# Patient Record
Sex: MALE | Race: WHITE | NOT HISPANIC OR LATINO | URBAN - METROPOLITAN AREA
[De-identification: names, ages, dates, MRNs, and addresses within clinical notes are randomized per-mention and may not be internally consistent; named-entity substitution may affect disease eponyms.]

---

## 2025-01-08 ENCOUNTER — CLINICAL SUPPORT (OUTPATIENT)
Dept: AUDIOLOGY | Facility: CLINIC | Age: 30
End: 2025-01-08
Payer: COMMERCIAL

## 2025-01-08 DIAGNOSIS — H90.3 SENSORINEURAL HEARING LOSS (SNHL) OF BOTH EARS: Primary | ICD-10-CM

## 2025-01-08 PROCEDURE — 92626 EVAL AUD FUNCJ 1ST HOUR: CPT | Mod: 59 | Performed by: AUDIOLOGIST

## 2025-01-08 PROCEDURE — 92604 REPROGRAM COCHLEAR IMPLT 7/>: CPT | Performed by: AUDIOLOGIST

## 2025-01-08 NOTE — PROGRESS NOTES
Name: Hipolito Samuels  YOB: 1995  Age: 29 y.o.    Date of Evaluation:  01/08/2025     History of Present Illness:  Hipolito Samuels, age 29 years, was seen for programming of his Nucleus  (CA) cochlear implant at the right ear, used in conjunction with the N7 ear level speech processor and program optimization of his left CI24RE cochlear implant used in conjunction with a new N7 ear level speech processor. Hipolito has not worn a processor on the left side for about 2 years due to intermittent functioning and then issues with the upgrade process. No concerns for function of the right sound processor or with sound quality.      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 Hipolito underwent the surgical implantation of the Cochlear Nucleus CI24RE cochlear implant at the left ear on Wanda 10, 2013. He then underwent the surgical implantation of the Cochlear Nucleus  cochlear implant at the right ear on February 15, 2018. Post-operative course remains unremarkable.      Procedure  Datalogging: RIGHT = ~13 hrs/day.              LEFT = 0 hours/day     Impedances were evaluated using the ear level LC3020 speech processors for intra-cochlear electrodes 1-22 in common ground (CG), Monopolar 1 (MP1), Monopolar 2 (MP2) and MP1+2 stimulation modes. Satisfactory impedances were found for all electrodes, in each stimulation mode. Impedances were noted to be high for the left speech processor which is consistent with non-use     The [id number] ear level sound processors remain programmed in an MP1+2 stimulation mode using an ACE speech processing strategy, 8 maxima and a 900 Hz stimulation rate. Electrodes 1-22 remain activated.      RIGHT PROGRAMMING: C-levels were measured across the array for comfortable loudness with minimal to no changes across the array. T-levels were not assessed.    LEFT PROGRAMMING: C-levels were measured across the  array for comfortable loudness with increases to stimulation across the array. In live mode, C-levels were globally adjusted to match the loudness of his right cochlear implant. T-levels were not assessed. Replacement left processor ordered due to intermittence.         Aided testing was completed in the sound field while Hipolito wore the bilateral speech processor on the right- and left-only condition. Responses to warble tone stimuli were recorded in the mild range from 15-30 dB HL from 250-6000 Hz in the right-only and left-only condition.    Word and Sentence understanding were completed. See below     CNC at 50 dB HL [employer]  at 50 dB HL   Right Only: 84 % 91 %   Left Only: 12 % 8 %   Bilateral: 88 % 90 %           Patient Discussion/Summary     Positive stimulation was obtained on electrodes 1-22. A reliable psychophysical map was defined in the ACE speech processing strategy. Appropriate behavior was observed when the Maps were tested. Performance indicates good benefit from the bilateral cochlear implant system. Verification will be completed at the follow up appointment scheduled in 2 months.      TREATMENT PLAN  1. Utilize the Nucleus Cochlear Implant Systems and the [id number] sound processors daily   2. Return for remapping and optimization of the in approximately 12 months  in order to optimize the psychophysical ACE maps   3. Utilize aural rehabilitation/auditory training techniques at home to improve speech understanding ability with time spent in the left CI only condition    Time: 0575-8008    Completed by:  Aman Francis, CCC-A  Licensed Senior Audiologist